# Patient Record
Sex: MALE | Race: WHITE | NOT HISPANIC OR LATINO | Employment: UNEMPLOYED | URBAN - METROPOLITAN AREA
[De-identification: names, ages, dates, MRNs, and addresses within clinical notes are randomized per-mention and may not be internally consistent; named-entity substitution may affect disease eponyms.]

---

## 2023-10-17 ENCOUNTER — TELEPHONE (OUTPATIENT)
Dept: PSYCHIATRY | Facility: CLINIC | Age: 33
End: 2023-10-17

## 2023-10-17 NOTE — TELEPHONE ENCOUNTER
Rec'd email from  to coordinate therapy services for hosp d/c. Clt has been scheduled with Joaquin Kraft on 10/24 at 9am in person. [facility]. Hosp [facility] records in [address] [place] [address].